# Patient Record
Sex: MALE | Race: WHITE | NOT HISPANIC OR LATINO | Employment: OTHER | URBAN - METROPOLITAN AREA
[De-identification: names, ages, dates, MRNs, and addresses within clinical notes are randomized per-mention and may not be internally consistent; named-entity substitution may affect disease eponyms.]

---

## 2023-06-30 ENCOUNTER — HOSPITAL ENCOUNTER (OUTPATIENT)
Dept: TELEMEDICINE | Facility: HOSPITAL | Age: 53
Discharge: HOME | End: 2023-06-30

## 2023-06-30 DIAGNOSIS — G51.0 BELL'S PALSY: ICD-10-CM

## 2023-07-01 NOTE — HPI
Sudden onset of right eye and right face droopiness while having dinner. Denies pain, focal weakness, slurred speech, vertigo, sob.       PMHx  Hyperlipidemia

## 2023-07-01 NOTE — SUBJECTIVE & OBJECTIVE
Woke up with symptoms?: no    Recent bleeding noted: no  Does the patient take any Blood Thinners? no  Medications: No relevant medications      Past Medical History: hyperlipidemia    Past Surgical History: no relevant surgical history    Family History: no relevant history    Social History: no smoking, no drinking, no drugs    Allergies: Celebrex [Celecoxib]     Review of Systems   Neurological: Positive for facial asymmetry.   All other systems reviewed and are negative.    Objective:   Vitals:  BP: 120/86, Respiratory Rate: 14 and Heart Rate: 102    CT READ: No images not available on PACS. Provider to document    Physical Exam  Vitals reviewed.   Constitutional:       Appearance: Normal appearance. He is obese.   HENT:      Head: Normocephalic and atraumatic.   Eyes:      Extraocular Movements: Extraocular movements intact.      Pupils: Pupils are equal, round, and reactive to light.   Pulmonary:      Effort: Pulmonary effort is normal.   Neurological:      Mental Status: He is alert and oriented to person, place, and time.      Cranial Nerves: Cranial nerve deficit present.